# Patient Record
Sex: FEMALE | Race: WHITE
[De-identification: names, ages, dates, MRNs, and addresses within clinical notes are randomized per-mention and may not be internally consistent; named-entity substitution may affect disease eponyms.]

---

## 2017-09-18 ENCOUNTER — HOSPITAL ENCOUNTER (OUTPATIENT)
Dept: HOSPITAL 39 - LAB.O | Age: 37
Discharge: HOME | End: 2017-09-18
Attending: ORTHOPAEDIC SURGERY
Payer: SELF-PAY

## 2017-09-18 DIAGNOSIS — Z01.818: Primary | ICD-10-CM

## 2017-10-02 NOTE — HP
CHIEF COMPLAINT:  Right knee pain.



HISTORY OF PRESENT ILLNESS:  Ms. Reis is a 36-year-old female with a 
history of right knee pain that has been going on since May of this year.  She 
had a twisting injury and has been having mechanical symptoms that she says 
includes locking up.  She denies any radiation of pain and denies any 
neurologic symptoms.  The pain is predominantly along the medial aspect of her 
knee.  She has had an MRI and the MRI does demonstrate the presence of a 
meniscus tear.  Because of the pain and the presence of meniscus tear on MRI, 
we have talked about options.  At this point, we are going to proceed with knee 
arthroscopy.  



PAST SURGICAL HISTORY:  

1.  Gastric sleeve.

2.  Toe surgery.



MEDICATIONS:  

1.  Lexapro.



ALLERGIES:   NO KNOWN DRUG ALLERGIES.



CODE STATUS:  Full code.



IMMUNIZATIONS:  Up to date.



SOCIAL HISTORY:  The patient does not smoke or use any illicit drugs.  She does 
drink on occasion.



FAMILY HISTORY:  None pertinent to today's complaint.



REVIEW OF SYSTEMS:  Negative except as indicated in the History of Present 
Illness.



PHYSICAL EXAMINATION:



VITAL SIGNS:  Blood pressure 116/72.  Pulse 78.  Height 6'.  Weight 210.



MENTAL STATUS:  The patient is awake, alert, and is able to give a good history 
and participate in the physical.  The patient is oriented to person, place and 
time.



SKIN:  Normal tone and turgor.



HEENT:  Normocephalic, atraumatic.  Pupils equal, round and reactive.  Mucosal 
membranes are moist.



NECK:  Normal range of motion.  No thyromegaly, no lymphadenopathy. 



CHEST:  Normal respiratory excursion.



CARDIAC:  Regular rate and rhythm.  No murmurs, rubs or gallops.



MUSCULOSKELETAL:  She has an obvious moderate effusion.  She is very tender to 
palpation along the medial aspect of the knee.  She has intact sensation 
throughout.  It is warm and well perfused and she does not have any varus/
valgus or anterior/posterior laxity.  She does maintain full extension and 
flexion is to about 125 go 130 degrees.  She does have some clicking with 
flexion and extension.  



IMAGING:  MRI shows evidence of a medial meniscus tear, however, x-rays were 
within normal limits.



ASSESSMENT:

1.  Medial meniscus tear with mechanical symptoms.



PLAN: The plan at this point is for knee arthroscopy.  We have discussed the 
risks, benefits, and alternatives to that and the patient has given informed 
consent.



#[id number]
[facility]

## 2017-10-11 ENCOUNTER — HOSPITAL ENCOUNTER (OUTPATIENT)
Dept: HOSPITAL 39 - AMB | Age: 37
Discharge: HOME | End: 2017-10-11
Attending: ORTHOPAEDIC SURGERY
Payer: COMMERCIAL

## 2017-10-11 VITALS — TEMPERATURE: 97.6 F | SYSTOLIC BLOOD PRESSURE: 130 MMHG | DIASTOLIC BLOOD PRESSURE: 79 MMHG

## 2017-10-11 VITALS — OXYGEN SATURATION: 98 %

## 2017-10-11 DIAGNOSIS — M22.41: ICD-10-CM

## 2017-10-11 DIAGNOSIS — Z98.84: ICD-10-CM

## 2017-10-11 DIAGNOSIS — M23.203: Primary | ICD-10-CM

## 2017-10-11 PROCEDURE — 29877 ARTHRS KNEE SURG DBRDMT/SHVG: CPT

## 2017-10-11 PROCEDURE — 81025 URINE PREGNANCY TEST: CPT

## 2017-10-11 PROCEDURE — 01400 ANES OPN/ARTHRS KNEE JT NOS: CPT

## 2017-10-12 NOTE — OP
DATE OF PROCEDURE:  10/11/17



PREOPERATIVE DIAGNOSIS: 

1.  Medial meniscus tear.

2.  Chondromalacia.



POSTOPERATIVE DIAGNOSIS: 

1.  Full thickness cartilaginous defect involving the medial compartment as 
well as

     the patellofemoral compartment.  



PROCEDURE: 

1.  Debridement.

2.  Chondroplasty.



SURGEON:  Micky Cedeno MD.



ASSISTANT:  Angel Luis Lester CST, -C.



ANESTHESIA:  General.



COMPLICATIONS:  None.



FINDINGS:   

1.  Full thickness cartilaginous defect involving the medial femoral condyle.

2.  Normal medial meniscus. 

3.  Normal anterior cruciate ligament.

4.  Normal posterior cruciate ligament.

5.  Degenerative changes in the lateral compartment measuring grade 2.

6.  Normal lateral meniscus.

7.  Normal lateral gutter.

8.  Normal suprapatellar pouch.

9.  Full thickness defects involving the articular surface of the 
patellofemoral joint

     on both the femur and the patella.

10. Normal medial gutter.



INDICATION:  Ms. Reis has a history of knee pain with some mechanical 
symptoms.  Although the MRI did appear to demonstrate some meniscal tearing, 
there was also chondromalacia and we discussed the findings on MRI and the 
potential for multiple procedures inclusive of debridement, chondroplasty and 
possible meniscectomy.  After discussing the risks, benefits and alternatives 
to operative therapy, the patient has given informed consent.



PROCEDURE:  The patient was brought to the Operating Room and placed in supine 
position.  General anesthesia was induced and the patient's leg was sterilely 
prepped and draped.  Following prepping and draping, standard anteromedial and 
anterolateral portals were established.  Diagnostic arthroscopy was carried out 
with the above findings.  Attention was then focused on the medial femoral 
condyle.  Using a 3.5 mm full radius shaver, the cartilaginous surface was 
debrided to stable base.  Following that, I elected to not do any type of 
microfracture just given the fact that she did have changes on her 
patellofemoral joint and the stresses there would not likely allow good 
healing.  The knee was thoroughly irrigated and drained.  Following draining of 
the knee, the wounds were closed with Nylon suture.  Sterile dressings were 
placed.  The patient was awoken from anesthesia and taken to Recovery.  



POSTOPERATIVE INSTRUCTIONS:  The patient will be partial weightbearing until 
followup with us in about two days.  We will talk further about her options 
once she follows up.



#[id number]
[facility]

## 2018-12-20 ENCOUNTER — HOSPITAL ENCOUNTER (OUTPATIENT)
Dept: HOSPITAL 39 - RAD | Age: 38
End: 2018-12-20
Attending: ORTHOPAEDIC SURGERY
Payer: COMMERCIAL

## 2018-12-20 DIAGNOSIS — M25.551: ICD-10-CM

## 2018-12-20 DIAGNOSIS — M25.561: Primary | ICD-10-CM

## 2018-12-20 NOTE — RAD
EXAM DESCRIPTION: Knee,Right Complete



CLINICAL HISTORY: 38 years, Female, PAIN IN RIGHT KNEE



COMPARISON: None



TECHNIQUE: Four views of the right knee including standing views



FINDINGS:



No fracture or dislocation. Bones appear osteopenic with

prominent trabecular pattern.



Narrowed appearance of medial more than lateral compartments on

frontal view with mild medial joint line spurring and spurring of

the tibial spines. Lateral view shows normal position of the

patella. No patellar spurring or enthesopathy. Question small

suprapatellar knee joint effusion. Normal contour of quadriceps

and patellar tendons.



No abnormal patellar tilt or subluxation  on patellar sunrise

view. Spurring of the anterior medial femoral condyle.



IMPRESSION:



Degenerative changes as described.



Electronically signed by:  Crispin Dewitt MD  12/20/2018 9:16

AM CST Workstation: [id number]

## 2018-12-20 NOTE — RAD
EXAM DESCRIPTION: Pelvis



CLINICAL HISTORY: 38 years Female, PAIN IN RIGHT HIP



COMPARISON: None.



FINDINGS: Degenerative spurring is seen at the right more than

left femoral head neck junctions. Transitional vertebrae at L5.

Sacrum appears intact. Pelvic ring appears intact with

phleboliths in the right pelvis.



IMPRESSION:



Degenerative changes of the right hip more than left.



Electronically signed by:  Crispin Dewitt MD  12/20/2018 9:16

AM [facility] Workstation: [id number]